# Patient Record
Sex: MALE | Race: WHITE | Employment: UNEMPLOYED | URBAN - METROPOLITAN AREA
[De-identification: names, ages, dates, MRNs, and addresses within clinical notes are randomized per-mention and may not be internally consistent; named-entity substitution may affect disease eponyms.]

---

## 2019-11-16 ENCOUNTER — HOSPITAL ENCOUNTER (EMERGENCY)
Facility: CLINIC | Age: 39
Discharge: HOME | End: 2019-11-17
Attending: EMERGENCY MEDICINE | Admitting: EMERGENCY MEDICINE
Payer: COMMERCIAL
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
## 2019-11-16 DIAGNOSIS — F10.920 ALCOHOLIC INTOXICATION WITHOUT COMPLICATION (H): ICD-10-CM

## 2019-11-16 PROCEDURE — 99285 EMERGENCY DEPT VISIT HI MDM: CPT | Mod: 25

## 2019-11-16 NOTE — ED AVS SNAPSHOT
Emergency Department  [address] [place] [id number]  Phone:  [phone]  Fax:  [phone]                                    Cal Bernabe   MRN: [id number]    Department:   Emergency Department   Date of Visit:  11/16/2019           After Visit Summary Signature Page    I have received my discharge instructions, and my questions have been answered. I have discussed any challenges I see with this plan with the nurse or doctor.    ..........................................................................................................................................  Patient/Patient Representative Signature      ..........................................................................................................................................  Patient Representative Print Name and Relationship to Patient    ..................................................               ................................................  Date                                   Time    ..........................................................................................................................................  Reviewed by Signature/Title    ...................................................              ..............................................  Date                                               Time          22EPIC Rev 08/18

## 2019-11-17 ENCOUNTER — APPOINTMENT (OUTPATIENT)
Dept: CT IMAGING | Facility: CLINIC | Age: 39
End: 2019-11-17
Attending: EMERGENCY MEDICINE
Payer: COMMERCIAL

## 2019-11-17 VITALS
SYSTOLIC BLOOD PRESSURE: 134 MMHG | TEMPERATURE: 97.8 F | OXYGEN SATURATION: 92 % | DIASTOLIC BLOOD PRESSURE: 71 MMHG | RESPIRATION RATE: 16 BRPM

## 2019-11-17 LAB
ANION GAP SERPL CALCULATED.3IONS-SCNC: 5 MMOL/L (ref 3–14)
BASOPHILS # BLD AUTO: 0.1 10E9/L (ref 0–0.2)
BASOPHILS NFR BLD AUTO: 0.6 %
BUN SERPL-MCNC: 6 MG/DL (ref 7–30)
CALCIUM SERPL-MCNC: 8.2 MG/DL (ref 8.5–10.1)
CHLORIDE SERPL-SCNC: 109 MMOL/L (ref 94–109)
CO2 SERPL-SCNC: 28 MMOL/L (ref 20–32)
CREAT SERPL-MCNC: 0.5 MG/DL (ref 0.66–1.25)
DIFFERENTIAL METHOD BLD: ABNORMAL
EOSINOPHIL # BLD AUTO: 0.3 10E9/L (ref 0–0.7)
EOSINOPHIL NFR BLD AUTO: 2.9 %
ERYTHROCYTE [DISTWIDTH] IN BLOOD BY AUTOMATED COUNT: 15 % (ref 10–15)
ETHANOL SERPL-MCNC: 0.44 G/DL
GFR SERPL CREATININE-BSD FRML MDRD: >90 ML/MIN/{1.73_M2}
GLUCOSE SERPL-MCNC: 153 MG/DL (ref 70–99)
HCT VFR BLD AUTO: 37.7 % (ref 40–53)
HGB BLD-MCNC: 12.6 G/DL (ref 13.3–17.7)
IMM GRANULOCYTES # BLD: 0.1 10E9/L (ref 0–0.4)
IMM GRANULOCYTES NFR BLD: 0.6 %
LYMPHOCYTES # BLD AUTO: 3.6 10E9/L (ref 0.8–5.3)
LYMPHOCYTES NFR BLD AUTO: 37.3 %
MCH RBC QN AUTO: 33.9 PG (ref 26.5–33)
MCHC RBC AUTO-ENTMCNC: 33.4 G/DL (ref 31.5–36.5)
MCV RBC AUTO: 101 FL (ref 78–100)
MONOCYTES # BLD AUTO: 1 10E9/L (ref 0–1.3)
MONOCYTES NFR BLD AUTO: 9.9 %
NEUTROPHILS # BLD AUTO: 4.7 10E9/L (ref 1.6–8.3)
NEUTROPHILS NFR BLD AUTO: 48.7 %
NRBC # BLD AUTO: 0 10*3/UL
NRBC BLD AUTO-RTO: 0 /100
PLATELET # BLD AUTO: 91 10E9/L (ref 150–450)
POTASSIUM SERPL-SCNC: 3.2 MMOL/L (ref 3.4–5.3)
RBC # BLD AUTO: 3.72 10E12/L (ref 4.4–5.9)
SODIUM SERPL-SCNC: 142 MMOL/L (ref 133–144)
WBC # BLD AUTO: 9.6 10E9/L (ref 4–11)

## 2019-11-17 PROCEDURE — 72125 CT NECK SPINE W/O DYE: CPT

## 2019-11-17 PROCEDURE — 70450 CT HEAD/BRAIN W/O DYE: CPT

## 2019-11-17 PROCEDURE — 80320 DRUG SCREEN QUANTALCOHOLS: CPT | Performed by: EMERGENCY MEDICINE

## 2019-11-17 PROCEDURE — 85025 COMPLETE CBC W/AUTO DIFF WBC: CPT | Performed by: EMERGENCY MEDICINE

## 2019-11-17 PROCEDURE — 80048 BASIC METABOLIC PNL TOTAL CA: CPT | Performed by: EMERGENCY MEDICINE

## 2019-11-17 ASSESSMENT — ENCOUNTER SYMPTOMS
NECK PAIN: 0
ABDOMINAL PAIN: 0

## 2019-11-17 NOTE — ED NOTES
During initial neuro assessment patient was alert and oriented X4. Nurse went to reassess patient and patient was lethargic and confused X4

## 2019-11-17 NOTE — ED NOTES
Patient out of bed, urinating on floor. Patient provided urinal and assistance with urinal. Patient helped back into bed where he removed C-collar. C-collar placed back on patient by RN and EDT and he was informed of the reasons necessary for him to keep it in place. Pt states he wants to leave AMA.

## 2019-11-17 NOTE — ED NOTES
Bed: BH3  Expected date:   Expected time:   Means of arrival:   Comments:  531  39M  ETOH/Fall 234

## 2019-11-17 NOTE — ED PROVIDER NOTES
History     Chief Complaint:  Alcohol Intoxication and Fall    The history is provided by the EMS personnel and the patient.      Cal Bernabe is a 39 year old male who presents with alcohol intoxication and fall. The patient was swimming with his 5 year old son at a hotel earlier tonight when he fell at the side of the pool. The patient fell hitting his head and face on the ground. EMS was called by the hotel staff after they found the patient in his hotel room unable to be easily aroused. The patient admits to drinking 1L of fireball today, but denies any loss of consciousness from the fall.  He reports he has been able to walk since then without any concern or pain.  He denies any concern for extremity fracture.  The patient admits to drinking alcohol on a regular basis.  He reports he has been in treatment numerous times in the past and does not want treatment today.  He further denies any abdominal pain, chest pain, neck pain, or jaw pain.  The patient denies being short of breath now.  He admits to having a bloody nose earlier and that has now resolved.    Allergies:  No Known Drug Allergies    Medications:    Medications reviewed. No current medications.     Past Medical History:    Umbilical hernia     Past Surgical History:    Hernia reduction     Family History:    Family history reviewed. No pertinent family history.      Social History:  The patient is from [place], [place]  Marital Status:       Review of Systems   HENT:        Negative jaw pain   Gastrointestinal: Negative for abdominal pain.   Musculoskeletal: Negative for neck pain.   Neurological:        Negative loss of consciousness   All other systems reviewed and are negative.    Physical Exam     Patient Vitals for the past 24 hrs:   BP Temp Temp src Heart Rate Resp SpO2   11/17/19 0140 -- -- -- 101 -- 92 %   11/17/19 0131 -- -- -- 103 -- 97 %   11/16/19 2358 134/71 97.8  F (36.6  C) Temporal 114 16 94 %       Physical  Exam  Physical Exam   General:  Sitting on bed by self. Pt in no significant distress. Talkative.  HENT:  No obvious trauma to head. Negative levine's sign and negative raccoon eyes bilaterally.  Right Ear:  External ear normal.  Left Ear:  External ear normal.  Nose:  Nose normal. Dried blood around bilateral nares.  No active epistaxis.  Eyes:  Conjunctivae and EOM are normal. Pupils are equal and round.   Neck: Normal range of motion. Neck supple. No tracheal deviation present. C-collar in place.  No midline cervical neck tenderness, deformity, step off or pain in the midline.  CV:  Normal heart sounds. No murmur heard.  Pulm/Chest: Effort normal and breath sounds normal.   Abd: Soft. No distension. There is no tenderness.  Reducible, nontender umbilical hernia is present.  There is no rigidity, no rebound and no guarding.   M/S: Normal range of motion. No pain to palpation or deformity of all 4 extremities.  Neuro: Alert. CN II-XII Grossly intact. GCS 15.  Slurring words and intoxicated appearing  Skin: Skin is warm and dry. No rash noted. Not diaphoretic.   Psych: Normal mood and affect. Behavior is normal.     Emergency Department Course   Imaging:  CERVICAL SPINE CT:  1.  The exam is moderately degraded by motion.  2.  Within these limitations, no definite acute fracture or subluxation.  3.  No high-grade central canal or neural foraminal stenosis.  Reading per radiology     HEAD CT:  1.  Normal head CT.  2.  Moderate membrane thickening/inflammation of the paranasal sinuses.  Reading per radiology     Laboratory:  CBC: WBC 9.6, HGB 12.6 (L), PLT 91 (L)  BMP: Potassium 3.2 (L), glucose 153 (H), bun 6 (L), calcium 8.2 (L), o/w WNL (Creatinine 0.5 (L))    Alcohol ethyl: 0.44 (HH)    Emergency Department Course:  Nursing notes and vitals reviewed.     0025       I performed an exam of the patient as documented above.     IV was inserted and blood was drawn for laboratory testing, results above.     The patient  was sent for a Head and cervical spine CT while in the emergency department, results above.      0225 I rechecked on the patient. The mother is at bedside and feels comfortable taking the patient home.  Patient was able to get up and ambulate around the emergency department without any concern or instability.    Findings and plan explained to the Patient and mother. Patient discharged home with instructions regarding supportive care, medications, and reasons to return. The importance of close follow-up was reviewed.    Impression & Plan      Medical Decision Making:  Cal Bernabe is a very pleasant 39 year old year old patient who presents to the emergency department with concern of alcohol abuse.  He is intoxicated here in ED by blood work.  Blood work otherwise looks ok; no signs of alcoholic ketoacidosis, significant liver impairment or acute alcoholic hepatitis. He has no history of DT's or alcohol withdrawal seizures. There are no signs of co-ingestion including acetaminophen, drugs, medications, volatile alcohols.  The patient presented to the ED secondary to a fall while intoxicated.  Due to his intoxication and with the trauma to the head, I obtained a CT of the head.  Fortunately, there is no evidence of intracranial bleed.  The patient is originally in a c-collar by EMS.  Cervical spine CT is unremarkable for fracture and he had no midline step-off or pain.  This collar was removed.  The patient's mother, a sober ride, was contacted and agreed to come  the patient and she is also watching the patient's 5-year-old son.  Alcohol counseling provided by myself and patient does not want treatment resources.     The treatment plan was discussed with the patient and they expressed understanding of this plan and consented to the plan.  In addition, the patient will return to the emergency department if their symptoms persist, worsen, if new symptoms arise or if there is any concern as other pathology may be  present that is not evident at this time. They also understand the importance of close follow up in the clinic and if unable to do so will return to the emergency department for a reevaluation. All questions were answered.    Diagnosis:    ICD-10-CM    1. Alcoholic intoxication without complication (H) F10.920    2. Fall, initial encounter W19.XXXA        Disposition:  The patient is discharged to home.     IJose Francisco, am serving as a scribe at 3:23 AM on 11/17/2019 to document services personally performed by Abiodun Donald DO  based on my observations and the provider's statements to me.    IAllison, am serving as a scribe at 3:23 AM on 11/17/2019 to document services personally performed by Abiodun Donald DO  based on my observations and the provider's statements to me.  11/16/2019    EMERGENCY DEPARTMENT       Abiodun Donald DO  11/17/19 0333

## 2019-11-17 NOTE — ED NOTES
DATE:  11/17/2019   TIME OF RECEIPT FROM LAB:  0118  LAB TEST:  Ethanol   LAB VALUE:  0.44  RESULTS GIVEN WITH READ-BACK TO (PROVIDER):  Dr. Donald  TIME LAB VALUE REPORTED TO PROVIDER:  0119

## 2019-11-17 NOTE — ED TRIAGE NOTES
Patient arrives to the ED today after having a fall at the side of the pool of the hotel he was staying at earlier tonight. Patient fell hitting his head/face on the ground. Patient denies LOC during the fall but EMS was called by hospital staff after finding that the patient was not easily aroused when they went to check on the patient in his hotel room after the fall. Patient admits to being a daily drinker and states that he drank 1L of fireball today.